# Patient Record
Sex: FEMALE | Race: OTHER | HISPANIC OR LATINO | URBAN - METROPOLITAN AREA
[De-identification: names, ages, dates, MRNs, and addresses within clinical notes are randomized per-mention and may not be internally consistent; named-entity substitution may affect disease eponyms.]

---

## 2018-12-24 ENCOUNTER — EMERGENCY (EMERGENCY)
Facility: HOSPITAL | Age: 43
LOS: 1 days | Discharge: HOME | End: 2018-12-24
Attending: EMERGENCY MEDICINE
Payer: MEDICAID

## 2018-12-24 VITALS
HEIGHT: 65 IN | OXYGEN SATURATION: 99 % | SYSTOLIC BLOOD PRESSURE: 104 MMHG | WEIGHT: 149.91 LBS | DIASTOLIC BLOOD PRESSURE: 69 MMHG | RESPIRATION RATE: 18 BRPM | HEART RATE: 72 BPM | TEMPERATURE: 98 F

## 2018-12-24 PROCEDURE — 99283 EMERGENCY DEPT VISIT LOW MDM: CPT

## 2018-12-24 PROCEDURE — 73610 X-RAY EXAM OF ANKLE: CPT

## 2018-12-24 PROCEDURE — 99283 EMERGENCY DEPT VISIT LOW MDM: CPT | Mod: 25

## 2018-12-24 PROCEDURE — 73610 X-RAY EXAM OF ANKLE: CPT | Mod: 26,RT

## 2018-12-24 RX ORDER — IBUPROFEN 200 MG
400 TABLET ORAL ONCE
Qty: 0 | Refills: 0 | Status: COMPLETED | OUTPATIENT
Start: 2018-12-24 | End: 2018-12-24

## 2018-12-24 RX ORDER — IBUPROFEN 200 MG
1 TABLET ORAL
Qty: 30 | Refills: 0 | OUTPATIENT
Start: 2018-12-24

## 2018-12-24 RX ADMIN — Medication 400 MILLIGRAM(S): at 11:44

## 2018-12-24 NOTE — ED PROVIDER NOTE - MEDICAL DECISION MAKING DETAILS
44 y/o F presents with ankle sprain. Negative X-ray. Will discharge with air cast, crutches and podiatry follow up in 1 week. Information given to care coordinator to arrange follow up.

## 2018-12-24 NOTE — ED PROVIDER NOTE - OBJECTIVE STATEMENT
42 y/o F with no significant PMHx or PSHx presents to the ED with complaints of R ankle pain. Patient reports going down the stairs yesterday and missed the last step, twisting her right ankle. Patient denies fall, focal weakness, paresthesias or any other acute complaints. NKDA.

## 2018-12-24 NOTE — ED PROCEDURE NOTE - CPROC ED POST PROC CARE GUIDE1
Verbal/written post procedure instructions were given to patient/caregiver./Elevate the injured extremity as instructed./Instructed patient/caregiver regarding signs and symptoms of infection./Keep the cast/splint/dressing clean and dry./Instructed patient/caregiver to follow-up with primary care physician.

## 2018-12-24 NOTE — ED PROVIDER NOTE - PHYSICAL EXAMINATION
Moderate swelling and tenderness over lateral malleolus. No tenderness over the base of the 5th metatarsal.

## 2020-10-09 ENCOUNTER — EMERGENCY (EMERGENCY)
Facility: HOSPITAL | Age: 45
LOS: 1 days | Discharge: HOME | End: 2020-10-09
Attending: EMERGENCY MEDICINE
Payer: MEDICAID

## 2020-10-09 VITALS
HEIGHT: 65 IN | RESPIRATION RATE: 18 BRPM | DIASTOLIC BLOOD PRESSURE: 79 MMHG | TEMPERATURE: 97 F | OXYGEN SATURATION: 98 % | HEART RATE: 84 BPM | WEIGHT: 154.98 LBS | SYSTOLIC BLOOD PRESSURE: 130 MMHG

## 2020-10-09 PROCEDURE — 99284 EMERGENCY DEPT VISIT MOD MDM: CPT | Mod: 25

## 2020-10-09 PROCEDURE — 99284 EMERGENCY DEPT VISIT MOD MDM: CPT

## 2020-10-09 PROCEDURE — 73630 X-RAY EXAM OF FOOT: CPT | Mod: 26,LT

## 2020-10-09 PROCEDURE — 73630 X-RAY EXAM OF FOOT: CPT

## 2020-10-09 PROCEDURE — 73610 X-RAY EXAM OF ANKLE: CPT

## 2020-10-09 PROCEDURE — 73610 X-RAY EXAM OF ANKLE: CPT | Mod: 26,LT

## 2020-10-09 RX ORDER — IBUPROFEN 200 MG
600 TABLET ORAL ONCE
Refills: 0 | Status: COMPLETED | OUTPATIENT
Start: 2020-10-09 | End: 2020-10-09

## 2020-10-09 RX ADMIN — Medication 600 MILLIGRAM(S): at 17:55

## 2020-10-09 RX ADMIN — Medication 600 MILLIGRAM(S): at 17:56

## 2020-10-09 NOTE — ED PROVIDER NOTE - OBJECTIVE STATEMENT
44 yo F no pmh presents with L ankle and foot pain s/p mechanical slip and fall from standing this morning. Denies other injuries or complaints. Latvian translation via NP student Татьяна.

## 2020-10-09 NOTE — ED PROVIDER NOTE - PATIENT PORTAL LINK FT
You can access the FollowMyHealth Patient Portal offered by [facility] by registering at the following website: [email or web address]. By joining [facility]’s FollowMyHealth portal, you will also be able to view your health information using other applications (apps) compatible with our system.

## 2020-10-09 NOTE — ED PROVIDER NOTE - CLINICAL SUMMARY MEDICAL DECISION MAKING FREE TEXT BOX
46 yo F with ankle pain. xray neg for fracture (verbally discussed with Dr Thomson / radiology). Pain improved, but pt still have difficulty bearing weight so given crutches and ACE wrap. Will fu with ortho. Discussed indications for patient return to ED. Patient understood.

## 2020-10-09 NOTE — ED PROVIDER NOTE - NSFOLLOWUPCLINICS_GEN_ALL_ED_FT
[facility]  Orthopedics  [address] [place] [address]  Phone: [phone]  Fax: [phone]  Follow Up Time:

## 2020-10-09 NOTE — ED PROVIDER NOTE - PHYSICAL EXAMINATION
GENERAL: well appearing, no acute distress   HEAD: atraumatic   EYES: EOMI, pink conjunctiva   ENT: moist oral mucosa   CARDIAC: RRR, no edema, distal pulses present   RESPIRATORY: lungs CTAB, no increased work of breathing   GASTROINTESTINAL: no abdominal tenderness, no rebound or guarding, bowel sounds presents  GENITOURINARY: no CVA tenderness   MUSCULOSKELETAL: trace L lateral ankle swelling; ttp of base of 5th metatarsal; full but painful ankle ROM; compartments soft; good distal pulses; sensation intact to light touch   NEUROLOGICAL: AAOx3, spontaneous movement of extremities   SKIN: intact   PSYCHIATRIC: cooperative  HEME LYMPH: no lymphadenopathy

## 2020-10-09 NOTE — ED PROVIDER NOTE - NSFOLLOWUPINSTRUCTIONS_ED_ALL_ED_FT
[facility]                                                                                                                                                  Ankle Sprain       An ankle sprain is a stretch or tear in a ligament in the ankle. Ligaments are tissues that connect bones to each other.  The two most common types of ankle sprains are:  •Inversion sprain. This happens when the foot turns inward and the ankle rolls outward. It affects the ligament on the outside of the foot (lateral ligament).      •Eversion sprain. This happens when the foot turns outward and the ankle rolls inward. It affects the ligament on the inner side of the foot (medial ligament).        What are the causes?    This condition is often caused by accidentally rolling or twisting the ankle.      What increases the risk?    You are more likely to develop this condition if you play sports.      What are the signs or symptoms?  Symptoms of this condition include:  •Pain in your ankle.      •Swelling.      •Bruising. This may develop right after you sprain your ankle or 1–2 days later.      •Trouble standing or walking, especially when you turn or change directions.        How is this diagnosed?  This condition is diagnosed with:  •A physical exam. During the exam, your health care provider will press on certain parts of your foot and ankle and try to move them in certain ways.      •X-ray imaging. These may be taken to see how severe the sprain is and to check for broken bones.        How is this treated?  This condition may be treated with:  •A brace or splint. This is used to keep the ankle from moving until it heals.      •An elastic bandage. This is used to support the ankle.      •Crutches.      •Pain medicine.      •Surgery. This may be needed if the sprain is severe.      •Physical therapy. This may help to improve the range of motion in the ankle.        Follow these instructions at home:    If you have a brace or a splint:     •Wear the brace or splint as told by your health care provider. Remove it only as told by your health care provider.      •Loosen the brace or splint if your toes tingle, become numb, or turn cold and blue.      •Keep the brace or splint clean.    •If the brace or splint is not waterproof:  •Do not let it get wet.       •Cover it with a watertight covering when you take a bath or a shower.        If you have an elastic bandage (dressing):     •Remove it to shower or bathe.      •Try not to move your ankle much, but wiggle your toes from time to time. This helps to prevent swelling.      •Adjust the dressing to make it more comfortable if it feels too tight.      •Loosen the dressing if you have numbness or tingling in your foot, or if your foot becomes cold and blue.        Managing pain, stiffness, and swelling      •Take over-the-counter and prescription medicines only as told by your health care provider.      •For 2–3 days, keep your ankle raised (elevated) above the level of your heart as much as possible.    •If directed, put ice on the injured area:  •If you have a removable brace or splint, remove it as told by your health care provider.      •Put ice in a plastic bag.      •Place a towel between your skin and the bag.      •Leave the ice on for 20 minutes, 2–3 times a day.        General instructions     •Rest your ankle.      • Do not use the injured limb to support your body weight until your health care provider says that you can. Use crutches as told by your health care provider.      • Do not use any products that contain nicotine or tobacco, such as cigarettes, e-cigarettes, and chewing tobacco. If you need help quitting, ask your health care provider.      •Keep all follow-up visits as told by your health care provider. This is important.        Contact a health care provider if:    •You have rapidly increasing bruising or swelling.      •Your pain is not relieved with medicine.        Get help right away if:    •Your foot or toes become numb or blue.      •You have severe pain that gets worse.        Summary    •An ankle sprain is a stretch or tear in a ligament in the ankle. Ligaments are tissues that connect bones to each other.      •This condition is often caused by accidentally rolling or twisting the ankle.      •Symptoms include pain, swelling, bruising, and trouble walking.      •To relieve pain and swelling, put ice on the affected ankle, raise your ankle above the level of your heart, and use an elastic bandage.      •Keep all follow-up visits as told by your health care provider. This is important.      This information is not intended to replace advice given to you by your health care provider. Make sure you discuss any questions you have with your health care provider.      Document Released: 12/18/2006 Document Revised: 09/09/2019 Document Reviewed: 05/14/2019    Elsevier Patient Education © 2020 [employer]

## 2022-01-18 NOTE — ED PROCEDURE NOTE - CPROC ED POST RADIOGRAPHY1
Appointment reminder call made to parent.  Patient is scheduled with Dr. Antoine on 1/19/2022 at 1:45pm, please arrive 30 minutes early for hearing test.  Parent verbalized that they will be present at this time.        post procedure radiography not performed/extremity correctly positioned, splinted

## 2022-11-02 ENCOUNTER — EMERGENCY (EMERGENCY)
Facility: HOSPITAL | Age: 47
LOS: 1 days | Discharge: HOME | End: 2022-11-02
Attending: EMERGENCY MEDICINE
Payer: MEDICAID

## 2022-11-02 VITALS
HEART RATE: 98 BPM | WEIGHT: 160.94 LBS | SYSTOLIC BLOOD PRESSURE: 103 MMHG | DIASTOLIC BLOOD PRESSURE: 63 MMHG | TEMPERATURE: 99 F | RESPIRATION RATE: 16 BRPM | OXYGEN SATURATION: 97 %

## 2022-11-02 LAB — SARS-COV-2 RNA SPEC QL NAA+PROBE: DETECTED

## 2022-11-02 PROCEDURE — 87635 SARS-COV-2 COVID-19 AMP PRB: CPT

## 2022-11-02 PROCEDURE — 99283 EMERGENCY DEPT VISIT LOW MDM: CPT

## 2022-11-02 NOTE — ED PROVIDER NOTE - OBJECTIVE STATEMENT
47-year-old female, history of asthma, presents for evaluation of mild throat irritation and dry cough x2 days.  Denies any other complaints.  Self tested for COVID at home twice and twice tested positive.  Reports that the tests were  since August.  Denies any chest pain shortness of breath, dizziness or any other complaints.

## 2022-11-02 NOTE — ED ADULT TRIAGE NOTE - CHIEF COMPLAINT QUOTE
[id number] fanny  I got the sore throat since yesterday, been coughing , did the covid test which is  and it was positive

## 2022-11-02 NOTE — ED PROVIDER NOTE - CLINICAL SUMMARY MEDICAL DECISION MAKING FREE TEXT BOX
47-year-old female, presents for evaluation of throat irritation and dry cough x2 days.  Vital signs within normal limits, afebrile.  High suspicion that she has COVID since she tested twice positive for COVID.  Will repeat test will discharge home with isolation x5 days.  Discussed red flags to come back.  Not a candidate for antiviral therapy.

## 2022-11-02 NOTE — ED PROVIDER NOTE - NSFOLLOWUPINSTRUCTIONS_ED_ALL_ED_FT
Isolate yourself for the next 5 days and then you can come out of isolation as long as you are 3 days without fever (while not taking any medications for fever).    You will get a link via text message or email. You will need to click on the link and register to get the result. The test results may take up to 24 hours to come back.  Aíslese johnna los próximos 5 días y luego puede salir del aislamiento siempre que tenga 3 días sin fiebre (sin blanca ningún medicamento para la fiebre).    Recibirá un enlace por mensaje de texto o correo electrónico. Deberá hacer clic en el enlace y registrarse para obtener el resultado. Los resultados de la prueba pueden tardar hasta 24 horas en volver.    Regrese a la miles de emergencias si tiene dolor en el pecho, dificultad para respirar o cualquier síntoma que le preocupe.    Llame a mancilla número de emergencia local (911 en los [place] o pídale a otra persona que llame si:  •Tiene reece convulsión.  •No puede ser despertado.  •Tiene dolor en el pecho o dificultad para respirar.    Regrese al departamento de emergencias si:  •Tiene rigidez en el loretta, cristin dolor de angie y sensibilidad a la anam.  •Se siente débil, mareado o confundido.  •Dejas de orinar u orinas mucho menos de lo normal.  •Tose ismael o moco espeso de color amarillo o troy.  •Tiene dolor abdominal intenso o mancilla abdomen es más charles de lo normal.    * * *  Come back to the emergency room if you have chest pain, shortness of breath or any concerning symptoms to you.    Call your local emergency number (911 in the [place]) or have someone else call if:   •You have a seizure.  •You cannot be woken.  •You have chest pain or trouble breathing.    Return to the emergency department if:   •You have a stiff neck, a bad headache, and sensitivity to light.  •You feel weak, dizzy, or confused.  •You stop urinating or urinate a lot less than usual.  •You cough up blood or thick yellow or green mucus.  •You have severe abdominal pain or your abdomen is larger than usual.

## 2022-11-02 NOTE — ED PROVIDER NOTE - ATTENDING APP SHARED VISIT CONTRIBUTION OF CARE
seen with acp   here for sore throat  tested positive for covid  will provide supportive therapy  agree with acps assessment hx and physical and disposition

## 2022-11-02 NOTE — ED ADULT NURSE NOTE - CHIEF COMPLAINT QUOTE
[id number] fanny  I got the sore throat since yesterday, been coughing , did the covid test which is  and it was positive

## 2025-03-04 ENCOUNTER — EMERGENCY (EMERGENCY)
Facility: HOSPITAL | Age: 50
LOS: 1 days | Discharge: HOME | End: 2025-03-04
Attending: EMERGENCY MEDICINE
Payer: COMMERCIAL

## 2025-03-04 VITALS
DIASTOLIC BLOOD PRESSURE: 74 MMHG | SYSTOLIC BLOOD PRESSURE: 122 MMHG | HEART RATE: 79 BPM | OXYGEN SATURATION: 98 % | TEMPERATURE: 98 F | WEIGHT: 149.03 LBS | RESPIRATION RATE: 19 BRPM

## 2025-03-04 LAB
FLUAV AG NPH QL: SIGNIFICANT CHANGE UP
FLUBV AG NPH QL: SIGNIFICANT CHANGE UP
RSV RNA NPH QL NAA+NON-PROBE: SIGNIFICANT CHANGE UP
SARS-COV-2 RNA SPEC QL NAA+PROBE: SIGNIFICANT CHANGE UP

## 2025-03-04 PROCEDURE — 0241U: CPT

## 2025-03-04 PROCEDURE — 87081 CULTURE SCREEN ONLY: CPT

## 2025-03-04 PROCEDURE — 87637 SARSCOV2&INF A&B&RSV AMP PRB: CPT

## 2025-03-04 PROCEDURE — 99283 EMERGENCY DEPT VISIT LOW MDM: CPT

## 2025-03-04 PROCEDURE — 99284 EMERGENCY DEPT VISIT MOD MDM: CPT

## 2025-03-04 RX ORDER — IBUPROFEN 200 MG
1 TABLET ORAL
Qty: 28 | Refills: 0
Start: 2025-03-04 | End: 2025-03-10

## 2025-03-04 RX ORDER — DEXAMETHASONE 0.5 MG/1
8 TABLET ORAL ONCE
Refills: 0 | Status: COMPLETED | OUTPATIENT
Start: 2025-03-04 | End: 2025-03-04

## 2025-03-04 RX ORDER — BENZONATATE 100 MG
100 CAPSULE ORAL ONCE
Refills: 0 | Status: COMPLETED | OUTPATIENT
Start: 2025-03-04 | End: 2025-03-04

## 2025-03-04 RX ORDER — IBUPROFEN 200 MG
600 TABLET ORAL ONCE
Refills: 0 | Status: COMPLETED | OUTPATIENT
Start: 2025-03-04 | End: 2025-03-04

## 2025-03-04 RX ORDER — BENZONATATE 100 MG
1 CAPSULE ORAL
Qty: 15 | Refills: 0
Start: 2025-03-04 | End: 2025-03-08

## 2025-03-04 RX ADMIN — Medication 600 MILLIGRAM(S): at 20:57

## 2025-03-04 RX ADMIN — DEXAMETHASONE 8 MILLIGRAM(S): 0.5 TABLET ORAL at 20:57

## 2025-03-04 RX ADMIN — Medication 600 MILLIGRAM(S): at 21:27

## 2025-03-04 RX ADMIN — Medication 100 MILLIGRAM(S): at 20:57

## 2025-03-04 NOTE — ED PROVIDER NOTE - CLINICAL SUMMARY MEDICAL DECISION MAKING FREE TEXT BOX
50-year-old female presents with cough and sore throat.  PE as above.    Physical exam is unremarkable.  Will send flu/COVID/RSV swab and group A strep swab.  Symptomatic control, reassess

## 2025-03-04 NOTE — ED PROVIDER NOTE - NSFOLLOWUPINSTRUCTIONS_ED_ALL_ED_FT
Infección de las vías respiratorias superiores en adultos  Upper Respiratory Infection, Adult  Reece infección de las vías respiratorias superiores (IVRS) es reece infección viral común de la nariz, garganta y de las vías respiratorias superiores que conducen el aire a los pulmones. El tipo más común de IVRS es el resfrío común. Las IVRS generalmente mejoran solas, sin tratamiento médico.    ¿Cuáles son las causas?  La causa es un virus. Se puede contagiar olegario virus:  Al aspirar las gotitas que reece persona infectada elimina al toser o estornudar.  Tocar algo que estuvo expuesto al virus (está contaminado) y luego tocarse la boca, la nariz o los ojos.  ¿Qué incrementa el riesgo?  Es más propenso a contraer reece IVRS si:  Es muy pequeño o de edad muy avanzada.  Tiene contacto cercano con otros, augustine en el trabajo, la escuela o un centro de atención médica.  Fuma.  Tiene reece enfermedad cardíaca o pulmonar a marixa plazo (crónica).  Tiene debilitado el sistema encargado de combatir las enfermedades (sistema inmunitario).  Tiene asma o alergias nasales.  Está sufriendo mucho estrés.  Tiene un déficit nutricional.  ¿Cuáles son los signos o síntomas?  La IVRS suele presentar alguno de los siguientes síntomas:  Secreción nasal o nariz tapada (congestión).  Tos.  Estornudos.  Dolor de garganta.  Dolor de angie.  Fatiga.  Fiebre.  Pérdida del apetito.  Dolor en la frente, detrás de los ojos y por encima de los pómulos (dolor sinusal).  Juliette musculares.  Enrojecimiento o irritación de los ojos.  Presión en los oídos o la toni.  ¿Cómo se diagnostica?  Esta afección se puede diagnosticar en función de los antecedentes médicos, los síntomas y un examen físico. El médico puede usar un hisopo para blanca reece muestra de mucosidad de la nariz (hisopado nasal). Esta muestra puede analizarse para determinar qué virus está provocando la enfermedad.    ¿Cómo se trata?  Las IVRS generalmente mejoran por sí solas en un período de entre 7 y 10 días. Los medicamentos no curan las IVRS, stacy el médico puede recomendarle ciertos medicamentos para ayudar a aliviar los síntomas, augustine por ejemplo:  Medicamentos para la tos de venta niles.  Antitusivos. Toser es un tipo de defensa contra reece infección que ayuda a limpiar el sistema respiratorio, de modo que tome estos medicamentos solo según se lo recomiende el médico.  Medicamentos para bajar la fiebre.  Siga estas instrucciones en mancilla casa:  Actividad    Descanse todo lo que sea necesario.  Si tiene fiebre, quédese en mancilla casa, es decir, no vaya al trabajo o la escuela, hasta que no tenga fiebre o hasta que el médico le diga que la IVRS ya no se puede diseminar a otras personas (ya no contagia). El médico puede pedirle que use reece máscara facial para evitar que disemine la infección.  Para aliviar los síntomas    Oskar gárgaras con reece mezcla de agua y sal 3 o 4 veces al día, o según sea necesario. Para preparar agua con sal, disuelva totalmente de ½ a 1 cucharadita (de 3 a 6 g) de sal en 1 taza (237 ml) de agua tibia.  Use un humidificador de aire frío para agregar humedad al aire. [place] puede ayudarlo a que respire mejor.  Comida y bebida    A comparison of three sample cups showing dark yellow, yellow, and pale yellow urine.  Meghann suficiente líquido augustine para mantener la orina de color amarillo pálido.  Wister sopas y caldos transparentes.  Instrucciones generales    A sign showing that a person should not smoke.  Use los medicamentos de venta niles y los recetados solamente augustine se lo haya indicado el médico. Estos incluyen medicamentos para el resfrío, para bajar la fiebre y antitusivos.  No consuma ningún producto que contenga nicotina o tabaco. Estos productos incluyen cigarrillos, tabaco para mascar y aparatos de vapeo, augustine los cigarrillos electrónicos. Si necesita ayuda para dejar de fumar, consulte al médico.  Aléjese del humo de otros fumadores.  Manténgase al día con todas las vacunas, incluso la vacuna anual (reece vez al año) contra la gripe.  Concurra a todas las visitas de seguimiento. [place] es importante.  Cómo evitar contagiar la infección a otros    Washing hands with soap and water.  Las IVRS pueden ser contagiosas. Para evitar que la infección se propague, tome las siguientes medidas:  Lávese las elizabeth con agua y jabón johnna al menos 20 segundos. Use desinfectante para elizabeth si no dispone de agua y jabón.  Evite tocarse la boca, la toni, los ojos o la nariz.  Tosa o estornude en un pañuelo de papel o en mancilla manga o codo en lugar de hacerlo en la mano o en el aire.  Comuníquese con un médico si:  Empeora en lugar de mejorar.  Tiene fiebre o escalofríos.  Tiene mucosidad marrón o breann.  Tiene reece secreción amarilla o marrón de la nariz.  Le duele la toni, especialmente al inclinarse hacia adelante.  Tiene los ganglios del loretta hinchados.  Siente dolor al tragar.  Tiene zonas neo en la parte de atrás de la garganta.  Solicite ayuda de inmediato si:  La falta de aire empeora.  Tiene síntomas intensos o persistentes de:  Dolor de angie.  Dolor de oído.  Dolor sinusal.  Dolor de pecho.  Tiene enfermedad pulmonar crónica junto con cualquiera de estos síntomas:  Emitir sonidos de silbidos agudos al respirar, más a menudo al exhalar (sibilancias).  Tos prolongada (más de 14 días).  Tos con ismael.  Cambio en la mucosidad habitual.  Tiene rigidez en el loretta.  Tiene cambios en:  La visión.  La audición.  El razonamiento.  El estado de ánimo.  Estos síntomas pueden indicar reece emergencia. Solicite ayuda de inmediato. Llame al 911.  No espere a cookie si los síntomas desaparecen.  No conduzca por kali propios medios hasta el hospital.  Resumen  Reece infección de las vías respiratorias superiores (IVRS) es reece infección común de la nariz, la garganta y las vías respiratorias superiores que conducen el aire a los pulmones.  La causa es un virus.  Las IVRS generalmente mejoran por sí solas en un período de entre 7 y 10 días.  Los medicamentos no curan las IVRS, stacy el médico puede recomendarle ciertos medicamentos para ayudar a aliviar los síntomas.  Esta información no tiene augustine fin reemplazar el consejo del médico. Asegúrese de hacerle al médico cualquier pregunta que tenga.    Document Revised: 08/15/2022 Document Reviewed: 08/15/2022

## 2025-03-04 NOTE — ED PROVIDER NOTE - PROGRESS NOTE DETAILS
Flu/COVID/RSV negative.  Likely viral.  Will discharge.  Follow-up with primary care doctor.  Return precautions discussed

## 2025-03-04 NOTE — ED ADULT NURSE NOTE - OBJECTIVE STATEMENT
· HPI Objective Statement: 50-year-old female denies past medical history coming in with 3-day history of nonproductive cough with associated sore throat.  Denies fevers.  Denies shortness of breath.

## 2025-03-04 NOTE — ED PROVIDER NOTE - PATIENT PORTAL LINK FT
You can access the FollowMyHealth Patient Portal offered by [facility] by registering at the following website: [email or web address]. By joining [facility]’s FollowMyHealth portal, you will also be able to view your health information using other applications (apps) compatible with our system.

## 2025-03-06 LAB
CULTURE RESULTS: SIGNIFICANT CHANGE UP
SPECIMEN SOURCE: SIGNIFICANT CHANGE UP

## 2025-05-12 ENCOUNTER — EMERGENCY (EMERGENCY)
Facility: HOSPITAL | Age: 50
LOS: 1 days | End: 2025-05-12
Attending: STUDENT IN AN ORGANIZED HEALTH CARE EDUCATION/TRAINING PROGRAM
Payer: COMMERCIAL

## 2025-05-12 VITALS
HEART RATE: 71 BPM | HEIGHT: 65 IN | DIASTOLIC BLOOD PRESSURE: 44 MMHG | SYSTOLIC BLOOD PRESSURE: 107 MMHG | OXYGEN SATURATION: 97 % | TEMPERATURE: 99 F | WEIGHT: 155.43 LBS | RESPIRATION RATE: 18 BRPM

## 2025-05-12 PROCEDURE — 73562 X-RAY EXAM OF KNEE 3: CPT | Mod: 26,RT

## 2025-05-12 PROCEDURE — 99284 EMERGENCY DEPT VISIT MOD MDM: CPT

## 2025-05-12 PROCEDURE — 73562 X-RAY EXAM OF KNEE 3: CPT

## 2025-05-12 PROCEDURE — 73610 X-RAY EXAM OF ANKLE: CPT

## 2025-05-12 PROCEDURE — 99284 EMERGENCY DEPT VISIT MOD MDM: CPT | Mod: 25

## 2025-05-12 PROCEDURE — 73610 X-RAY EXAM OF ANKLE: CPT | Mod: 26,LT

## 2025-05-12 RX ORDER — IBUPROFEN 200 MG
600 TABLET ORAL ONCE
Refills: 0 | Status: COMPLETED | OUTPATIENT
Start: 2025-05-12 | End: 2025-05-12

## 2025-05-12 RX ORDER — ACETAMINOPHEN 500 MG/5ML
975 LIQUID (ML) ORAL ONCE
Refills: 0 | Status: COMPLETED | OUTPATIENT
Start: 2025-05-12 | End: 2025-05-12

## 2025-05-12 RX ADMIN — Medication 600 MILLIGRAM(S): at 22:58

## 2025-05-12 RX ADMIN — Medication 975 MILLIGRAM(S): at 22:58

## 2025-05-12 NOTE — ED PROVIDER NOTE - PATIENT PORTAL LINK FT
You can access the FollowMyHealth Patient Portal offered by [facility] by registering at the following website: [email or web address]. By joining [facility]’s FollowMyHealth portal, you will also be able to view your health information using other applications (apps) compatible with our system.

## 2025-05-12 NOTE — ED ADULT NURSE NOTE - NSFALLHARMRISKINTERV_ED_ALL_ED

## 2025-05-12 NOTE — ED PROVIDER NOTE - NSFOLLOWUPINSTRUCTIONS_ED_ALL_ED_FT
LLeva la venda johnna el maria luz y se la puede quitar antes de acostarse   Howard ibuprofeno augustine usted necesite para el hinchazon y el dolor   Realice un seguimiento con el ortopedista si los sintomas no estan mejorando

## 2025-05-12 NOTE — ED PROVIDER NOTE - PHYSICAL EXAMINATION
GENERAL: no acute distress; well-developed  HEAD:  Atraumatic, Normocephalic  EYES: EOMI, PERRLA, conjunctiva and sclera clear  ENT: MMM; oropharynx clear  NECK: Supple, No JVD  CHEST/LUNG: Clear to auscultation bilaterally; No wheeze  HEART: Regular rate and rhythm; No murmurs, rubs, or gallops  ABDOMEN: Soft, Nontender, Nondistended; Bowel sounds present  EXTREMITIES:  2+ Peripheral Pulses, L ankle swelling and ttp.   PSYCH: AAOx3  NEUROLOGY: no focal motor or sensory deficits. 5/5 muscle strength in all extremities.   SKIN: Abrasion R patella

## 2025-05-12 NOTE — ED PROVIDER NOTE - CLINICAL SUMMARY MEDICAL DECISION MAKING FREE TEXT BOX
49 y/o F who presents with abrasion R knee and L ankle swelling pain after tripping on pothole.   Suspect abrasion and L ankle sprain   NSAIDs  r/o Fracture  Ace wrap   Anticipate Orthopedic follow up PRN.

## 2025-05-12 NOTE — ED ADULT NURSE NOTE - NSFALLRISKFACTORS_ED_ALL_ED
pain/Bone Condition: Including osteoporosis, prolonged steroid use or metastatic bone disease/cancer

## 2025-05-12 NOTE — ED PROVIDER NOTE - OBJECTIVE STATEMENT
49 y/o F who presents with abrasion R knee and L ankle swelling pain after tripping on pothole.     Did not take any medications prior to arrival. NKDA. Denies medical conditions. States that swelling of L ankle has been worsening.

## 2025-05-12 NOTE — ED PROVIDER NOTE - WR INTERPRETATION 1
ABD XR negative - non-specific, No free air, No air-fluid levels seenMSK XR negative - No fracture, No dislocation, No foreign body

## 2025-05-12 NOTE — ED PROVIDER NOTE - NSFOLLOWUPCLINICS_GEN_ALL_ED_FT
[facility]  Orthopedics  [address] [place] [address]  Phone: [phone]  Fax: [phone]

## 2025-05-12 NOTE — ED ADULT TRIAGE NOTE - CHIEF COMPLAINT QUOTE
Lt ankle pain, Rt knee pain with abrasion s/p tripped over a pothole on street today, denies any head injury, denies LOC

## 2025-05-13 VITALS
RESPIRATION RATE: 18 BRPM | DIASTOLIC BLOOD PRESSURE: 64 MMHG | TEMPERATURE: 98 F | OXYGEN SATURATION: 97 % | HEART RATE: 71 BPM | SYSTOLIC BLOOD PRESSURE: 106 MMHG

## 2025-05-13 RX ORDER — IBUPROFEN 200 MG
1 TABLET ORAL
Qty: 28 | Refills: 0
Start: 2025-05-13 | End: 2025-05-19

## 2025-05-22 ENCOUNTER — APPOINTMENT (OUTPATIENT)
Age: 50
End: 2025-05-22

## 2025-06-19 NOTE — ED ADULT NURSE NOTE - DISCHARGE DATE/TIME
Doing well, pathology discussed. No evidence of post operative complication. I will see the patient back in 3w with a TSH.      24-Dec-2018 12:23

## 2025-08-25 ENCOUNTER — NON-APPOINTMENT (OUTPATIENT)
Age: 50
End: 2025-08-25